# Patient Record
Sex: MALE | Race: AMERICAN INDIAN OR ALASKA NATIVE | Employment: FULL TIME | URBAN - METROPOLITAN AREA
[De-identification: names, ages, dates, MRNs, and addresses within clinical notes are randomized per-mention and may not be internally consistent; named-entity substitution may affect disease eponyms.]

---

## 2019-01-16 ENCOUNTER — TELEPHONE (OUTPATIENT)
Dept: SURGERY | Facility: CLINIC | Age: 39
End: 2019-01-16

## 2019-01-16 NOTE — TELEPHONE ENCOUNTER
Pt is an [address] [place] [address] employee, states he spoke to [address] and he mentioned he would be able to see him soon, states he discussed with PVK the reason he needs to see him, asking for appt sooner than march. Pls advise thank you.

## 2019-01-17 NOTE — TELEPHONE ENCOUNTER
LMTCB that I can offer a Consult appt for Fri. 2/1 at 2:40 pm. But e needs to c/b asap to confirm that he can accept that appt.

## 2019-01-25 NOTE — TELEPHONE ENCOUNTER
INNATCB. PT WILL NEED TO PAY THE CONSULT VISIT AMT OF $ 172.00 AT THE TIME OF THE APPT. BIRGIT, PLEASE PUT CALL THRU TO VERONA AT X [phone].

## 2019-01-25 NOTE — TELEPHONE ENCOUNTER
Spoke with pt and he confirmed that he can accept the appt and he stated that he does have INS as he is an [address] [place] [address] employee for over 2 yrs and he has requested new cards from the INS co. He gave an ID # of [id number], GRP # [id number].  I gave pt directions to th

## 2019-02-13 ENCOUNTER — TELEPHONE (OUTPATIENT)
Dept: SURGERY | Facility: CLINIC | Age: 39
End: 2019-02-13

## 2019-02-13 NOTE — TELEPHONE ENCOUNTER
Pt. states that he missed his Consult appt. and is requesting to be seen sooner then 1st avail., Pt. States that he works here at [address] [place] [address] and that [address] suggested for the pt. To see him. . An appt. Was offered to the pt. But pt. Prefers to be seen sooner.

## 2019-02-19 ENCOUNTER — OFFICE VISIT (OUTPATIENT)
Dept: INTERNAL MEDICINE CLINIC | Facility: CLINIC | Age: 39
End: 2019-02-19
Payer: COMMERCIAL

## 2019-02-19 VITALS
BODY MASS INDEX: 33.58 KG/M2 | SYSTOLIC BLOOD PRESSURE: 148 MMHG | DIASTOLIC BLOOD PRESSURE: 84 MMHG | HEIGHT: 67.75 IN | WEIGHT: 219 LBS | HEART RATE: 75 BPM

## 2019-02-19 DIAGNOSIS — N50.89 SCROTAL MASS: Primary | ICD-10-CM

## 2019-02-19 DIAGNOSIS — R03.0 ELEVATED BLOOD PRESSURE READING: ICD-10-CM

## 2019-02-19 PROCEDURE — 99212 OFFICE O/P EST SF 10 MIN: CPT | Performed by: INTERNAL MEDICINE

## 2019-02-19 PROCEDURE — 99202 OFFICE O/P NEW SF 15 MIN: CPT | Performed by: INTERNAL MEDICINE

## 2019-02-19 NOTE — TELEPHONE ENCOUNTER
Pt. States that he will accept. Appt. For 3/7/19 at 10:20am., but he is requesting for RN to call him at Work at [address]. I tried to reach RN, but not avail. ,

## 2019-02-19 NOTE — PROGRESS NOTES
Tammy Craig is a 34year old male. Patient presents with:  Hernia    HPI:   For approximately 2 years, he has had an enlarging \"bulge\" in his right groin and scrotum. He now notes occasional pain and discomfort associated with the area of swelling.   He is soft nontender without mass or hepatosplenomegaly  GENITAL: Large firm mass filling the entire right scrotum, with inability to examine adequately the right inguinal canal because of the size of the mass. ASSESSMENT AND PLAN:   1.  Scrotal mass  Not cl

## 2019-02-19 NOTE — TELEPHONE ENCOUNTER
Spoke to patient. Patient states he saw Dr. Chelita Fragoso today, Scrotal US ordered, is scheduled for tomorrow. Offered sooner consult for Friday 2/22 @ 0800. Patient agreed, consult appt changed.

## 2019-02-19 NOTE — PATIENT INSTRUCTIONS
Please schedule your ultrasound as soon as possible. Schedule an appointment for a physical when you can.

## 2019-02-19 NOTE — TELEPHONE ENCOUNTER
Pt scheduled with PVK on 4/16 for consult visit. Asking if there is anyway he can be seen sooner than next available appt.

## 2019-02-20 ENCOUNTER — TELEPHONE (OUTPATIENT)
Dept: INTERNAL MEDICINE CLINIC | Facility: CLINIC | Age: 39
End: 2019-02-20

## 2019-02-20 ENCOUNTER — HOSPITAL ENCOUNTER (OUTPATIENT)
Dept: ULTRASOUND IMAGING | Facility: HOSPITAL | Age: 39
Discharge: HOME | End: 2019-02-20
Attending: INTERNAL MEDICINE
Payer: COMMERCIAL

## 2019-02-20 DIAGNOSIS — N50.89 SCROTAL MASS: ICD-10-CM

## 2019-02-20 PROCEDURE — 76870 US EXAM SCROTUM: CPT | Performed by: INTERNAL MEDICINE

## 2019-02-20 PROCEDURE — 93975 VASCULAR STUDY: CPT | Performed by: INTERNAL MEDICINE

## 2019-02-20 NOTE — TELEPHONE ENCOUNTER
Pt called in to advise MTN that he had the U/S completed today and is requesting a call back with the results and next steps to be taken.   Please advise

## 2019-02-22 ENCOUNTER — OFFICE VISIT (OUTPATIENT)
Dept: SURGERY | Facility: CLINIC | Age: 39
End: 2019-02-22
Payer: COMMERCIAL

## 2019-02-22 VITALS
HEIGHT: 67 IN | HEART RATE: 74 BPM | WEIGHT: 219 LBS | SYSTOLIC BLOOD PRESSURE: 144 MMHG | BODY MASS INDEX: 34.37 KG/M2 | RESPIRATION RATE: 16 BRPM | TEMPERATURE: 98 F | DIASTOLIC BLOOD PRESSURE: 86 MMHG

## 2019-02-22 DIAGNOSIS — F17.200 SMOKING: ICD-10-CM

## 2019-02-22 DIAGNOSIS — N43.3 HYDROCELE, UNSPECIFIED HYDROCELE TYPE: Primary | ICD-10-CM

## 2019-02-22 DIAGNOSIS — R35.0 URINARY FREQUENCY: ICD-10-CM

## 2019-02-22 DIAGNOSIS — N50.82 SCROTAL PAIN: ICD-10-CM

## 2019-02-22 LAB
BILIRUB UR QL: NEGATIVE
CLARITY UR: CLEAR
COLOR UR: YELLOW
GLUCOSE UR-MCNC: NEGATIVE MG/DL
KETONES UR-MCNC: NEGATIVE MG/DL
NITRITE UR QL STRIP.AUTO: NEGATIVE
PH UR: 7 [PH] (ref 5–8)
PROT UR-MCNC: NEGATIVE MG/DL
RBC #/AREA URNS AUTO: 4 /HPF
SP GR UR STRIP: 1.02 (ref 1–1.03)
UROBILINOGEN UR STRIP-ACNC: <2
VIT C UR-MCNC: NEGATIVE MG/DL
WBC #/AREA URNS AUTO: 8 /HPF

## 2019-02-22 PROCEDURE — 99212 OFFICE O/P EST SF 10 MIN: CPT | Performed by: UROLOGY

## 2019-02-22 PROCEDURE — 99244 OFF/OP CNSLTJ NEW/EST MOD 40: CPT | Performed by: UROLOGY

## 2019-02-22 NOTE — PROGRESS NOTES
Manny Romberg is a 34year old male. HPI:   Patient presents with:  Testicular Swelling: right testicle swelling going on for 1 1/2 years, denies pain     History provided by patient. Patient works at [employer]     Scrotal Swelling  Right testicle s cancer. HISTORY:  No past medical history on file.    Past Surgical History:   Procedure Laterality Date   • OTHER 1987    Undescended left testis      Family History   Problem Relation Age of Onset   • Other (Lung Cancer) Father    • Breast Cancer time, place, person with normal affect  Exam appropriate for age; patellar reflexes normal  Head/Face: normocephalic  Eyes/Vision: no scleral icterus  Neck/Thyroid: neck is supple without adenopathy  Lymphatic: no abnormal cervical, supraclavicular or ingu enlarged fluid-filled scrotal sac/hydrocele measuring 16.6 x 9.5 x 2.3 cm. 2. Single testicle visualized without intrinsic lesion presumably the right testicle.          ASSESSMENT/PLAN:      (N43.3) Hydrocele, unspecified hydrocele type  (primary encounter decides to observe. He decides to provide a urine specimen for UA today.     (F17.200) Smoking  I explained to the patient that smoking increases risk for bladder cancer by 4x and recommend that patient take necessary precautions to allow for smoking cessat 2/22/2019  Cruzito Warner    By signing my name below, I, Cruzito Warner,  attest that this documentation has been prepared under the direction and in the presence of John Thomas MD.   Electronically Signed: Cruzito Warner, 2/22/2019,

## 2019-02-22 NOTE — PATIENT INSTRUCTIONS
Brittany Carson M.D.      1.  Urinalysis urine test--especially with ongoing smoking; look for any Microhematuria (microscopic blood in the urine)    2.   Please do everything possible to stop smoking since that increases the r

## 2019-02-24 ENCOUNTER — TELEPHONE (OUTPATIENT)
Dept: SURGERY | Facility: CLINIC | Age: 39
End: 2019-02-24

## 2019-02-24 DIAGNOSIS — F17.200 SMOKING: ICD-10-CM

## 2019-02-24 DIAGNOSIS — R31.29 MICROHEMATURIA: Primary | ICD-10-CM

## 2019-02-25 NOTE — TELEPHONE ENCOUNTER
I sent nurses the following assignment by means of \"result note\" ---  \"Nurses, please call patient; although staff is working on scheduling him for hydrocelectomy, UNEXPECTEDLY his urinalysis urine test came back abnormal showing 4 red blood cells which

## 2019-02-26 RX ORDER — PREDNISONE 50 MG/1
TABLET ORAL
Qty: 3 TABLET | Refills: 0 | Status: SHIPPED | OUTPATIENT
Start: 2019-02-26 | End: 2019-03-18

## 2019-02-26 NOTE — TELEPHONE ENCOUNTER
----- Message from Suzi Joya MD sent at 2/24/2019  7:31 PM CST -----  Nurses, please call patient; although staff is working on scheduling him for hydrocelectomy, UNEXPECTEDLY his urinalysis urine test came back abnormal showing 4 red blood cells w

## 2019-02-26 NOTE — TELEPHONE ENCOUNTER
Spoke to patient. Patient is aware of his urinalysis urine test and verbalized understanding. Patient states he has an allergy to shellfish; reaction was moderate with scratchy throat, hives.   Dr. Suzan Moran, do you want patient to have the Prep for CT

## 2019-02-27 ENCOUNTER — TELEPHONE (OUTPATIENT)
Dept: SURGERY | Facility: CLINIC | Age: 39
End: 2019-02-27

## 2019-02-27 NOTE — TELEPHONE ENCOUNTER
Please call patient back.   1.   CT urogram as to be performed at the [address] --please make clear allergy to shellfish, scratchy throat, hives  2    prednisone 50 mg   Tablet 13 hours before, 7 hours before, and 1 hour before procedure--I will try to e

## 2019-02-27 NOTE — TELEPHONE ENCOUNTER
Patient contacted. Relayed Dr. Antonella Luna' recommendations below to the patient. Patient verbalized understanding. Patient confirms his allergy to shellfish is scratchy throat and hives.   Patient is aware to scheduled his CT urogram at [facility], is

## 2019-02-27 NOTE — TELEPHONE ENCOUNTER
Pt called stating pt spoke to the rn yesterday to discuss the ct scan and pt's allergy. Advised would receive a call back.   Please call pt to advise

## 2019-02-27 NOTE — TELEPHONE ENCOUNTER
Patient states he would like to schedule hydrocelectomy/cysto with possible bladder biopsy. Please see 2/24 TE; results. Please see last office visit 2/22/19.

## 2019-03-08 ENCOUNTER — TELEPHONE (OUTPATIENT)
Dept: SURGERY | Facility: CLINIC | Age: 39
End: 2019-03-08

## 2019-03-08 ENCOUNTER — APPOINTMENT (OUTPATIENT)
Dept: LAB | Facility: HOSPITAL | Age: 39
End: 2019-03-08
Attending: UROLOGY
Payer: COMMERCIAL

## 2019-03-08 DIAGNOSIS — F17.200 SMOKING: ICD-10-CM

## 2019-03-08 DIAGNOSIS — R31.29 MICROHEMATURIA: ICD-10-CM

## 2019-03-08 PROCEDURE — 88108 CYTOPATH CONCENTRATE TECH: CPT

## 2019-03-08 PROCEDURE — 87086 URINE CULTURE/COLONY COUNT: CPT

## 2019-03-08 NOTE — TELEPHONE ENCOUNTER
Per Dr. Jacey Padilla request, patient has been scheduled, Tuesday, March 12 at , [facility] outpatient surgery center. Went over preop instructions with patient, verbalized understanding all questions answered.     Patient will be having CT urogram

## 2019-03-08 NOTE — IMAGING NOTE
Called pt via cellular phone to confirm pre-medication for contrast allergy. This is regarding his scheduled urogram on 3/11. No answer; left call back number.

## 2019-03-10 ENCOUNTER — HOSPITAL ENCOUNTER (OUTPATIENT)
Dept: CT IMAGING | Facility: HOSPITAL | Age: 39
Discharge: HOME | End: 2019-03-10
Attending: UROLOGY
Payer: COMMERCIAL

## 2019-03-10 ENCOUNTER — TELEPHONE (OUTPATIENT)
Dept: SURGERY | Facility: CLINIC | Age: 39
End: 2019-03-10

## 2019-03-10 DIAGNOSIS — F17.200 SMOKING: ICD-10-CM

## 2019-03-10 DIAGNOSIS — R31.29 MICROHEMATURIA: ICD-10-CM

## 2019-03-10 LAB — CREAT BLD-MCNC: 0.8 MG/DL (ref 0.5–1.5)

## 2019-03-10 PROCEDURE — 82565 ASSAY OF CREATININE: CPT

## 2019-03-10 PROCEDURE — 74178 CT ABD&PLV WO CNTR FLWD CNTR: CPT | Performed by: UROLOGY

## 2019-03-10 NOTE — TELEPHONE ENCOUNTER
Keisha Clark,  Please put urine cytology report and CT urogram report into preop packet for surgery on Tuesday, 3/12/19.   Many thanks, Dr. Dez Shell

## 2019-03-11 ENCOUNTER — TELEPHONE (OUTPATIENT)
Dept: SURGERY | Facility: CLINIC | Age: 39
End: 2019-03-11

## 2019-03-11 ENCOUNTER — HOSPITAL ENCOUNTER (OUTPATIENT)
Dept: CT IMAGING | Facility: HOSPITAL | Age: 39
Discharge: HOME | End: 2019-03-11
Attending: UROLOGY
Payer: COMMERCIAL

## 2019-03-11 NOTE — TELEPHONE ENCOUNTER
Printed and handed patient's to , CT urogram, urinalysis, urine culture, urine cytology is still in process. Once results are available, I will place results in patient's chart. Thanks Kim    Cytology results are done handed to dr. Latonia Dejesus.

## 2019-03-12 ENCOUNTER — LAB REQUISITION (OUTPATIENT)
Dept: LAB | Facility: HOSPITAL | Age: 39
End: 2019-03-12
Payer: COMMERCIAL

## 2019-03-12 ENCOUNTER — TELEPHONE (OUTPATIENT)
Dept: SURGERY | Facility: CLINIC | Age: 39
End: 2019-03-12

## 2019-03-12 DIAGNOSIS — Z01.89 ENCOUNTER FOR OTHER SPECIFIED SPECIAL EXAMINATIONS: ICD-10-CM

## 2019-03-12 PROCEDURE — 88112 CYTOPATH CELL ENHANCE TECH: CPT | Performed by: UROLOGY

## 2019-03-12 NOTE — TELEPHONE ENCOUNTER
Disability papers were fax'ed to Urology to be completed  Sent to [employer]  Dept and placed in bin [facility]

## 2019-03-12 NOTE — TELEPHONE ENCOUNTER
2  telephone discussions with patient on 3/11/19 =  3/10/19 patient had CT urogram on Sunday. I called patient at home Sunday evening; no answer but I leave voicemail.     3/11/19 Monday morning I called patient back; he answers; I explained that CT showed anesthesia at the surgery center. I reviewed with patient preoperative preparations. He had more questions but patient's in the office were waiting for me so I pronounced patient would come back.   I did call patient back this evening and he had more ques

## 2019-03-13 NOTE — TELEPHONE ENCOUNTER
Nurses,  Please check with this patient by phone on a daily basis concerning drainage through his suction drain. Let me know when it is 30 cc or less per 24 hours. Patient needs to measure and total it up every 24 hours.   Tentatively would like his drain

## 2019-03-13 NOTE — TELEPHONE ENCOUNTER
Dr. Es Salgado ,    3/12/19 complex exploration right scrotum, excision of giant right hydrocele (greater than 1 L fluid; 20 cm giant hydrocele),  Insertion of drain, cystoscopy (for workup of microhematuria--there were no bladder tumors). Performed at [address] [place] [address]; went home afterwards. Patient will follow up with us in the office.       Many thanks,        Lizzie Stevens M.D.

## 2019-03-13 NOTE — TELEPHONE ENCOUNTER
Patient contacted. Relayed Dr. Adilson Corley' orders below to the patient. Patient verbalized understanding. Patient states last night he emptied the suction drain twice last night for about a total of 40 cc.   Patient states he did not record this or the kenyon

## 2019-03-13 NOTE — TELEPHONE ENCOUNTER
[employer] Disability form for Dr. Abdelrahman Wilkinson received in Forms dept. Logged for processing. [employer] packet emailed to [email or web address]  NK

## 2019-03-14 NOTE — TELEPHONE ENCOUNTER
Patient called to report he emptied his suction train and for the past 24 hours he recorded 50 cc of drainage. Patient describes drainage as sanguineous. Informed patient that when drainage is 30 cc or less in 24 hours, drain can be removed by [facility].  (s

## 2019-03-15 NOTE — TELEPHONE ENCOUNTER
Patient called to report that he measured a total of 20 cc of drainage in the last 24 hours from his suction drain.       Per Dr. Olayinka Moore' orders below; when drainage through his suction drain is 30 cc or less per 24 hours, drain can be removed here in of

## 2019-03-15 NOTE — TELEPHONE ENCOUNTER
Called patient and offer f/u with Radha MILLER on Monday 3/18 @ 9:45 am. Patient agreed. I asked the PPD template via a email to open the time so that I can add patient.

## 2019-03-18 ENCOUNTER — OFFICE VISIT (OUTPATIENT)
Dept: SURGERY | Facility: CLINIC | Age: 39
End: 2019-03-18
Payer: COMMERCIAL

## 2019-03-18 ENCOUNTER — TELEPHONE (OUTPATIENT)
Dept: SURGERY | Facility: CLINIC | Age: 39
End: 2019-03-18

## 2019-03-18 VITALS
WEIGHT: 219 LBS | HEART RATE: 70 BPM | SYSTOLIC BLOOD PRESSURE: 133 MMHG | TEMPERATURE: 98 F | BODY MASS INDEX: 34 KG/M2 | DIASTOLIC BLOOD PRESSURE: 75 MMHG

## 2019-03-18 DIAGNOSIS — Z98.890 HISTORY OF HYDROCELECTOMY: ICD-10-CM

## 2019-03-18 DIAGNOSIS — Z48.03 ENCOUNTER FOR CHANGE OR REMOVAL OF DRAINS: Primary | ICD-10-CM

## 2019-03-18 PROCEDURE — 99212 OFFICE O/P EST SF 10 MIN: CPT | Performed by: NURSE PRACTITIONER

## 2019-03-18 PROCEDURE — 99024 POSTOP FOLLOW-UP VISIT: CPT | Performed by: NURSE PRACTITIONER

## 2019-03-18 NOTE — TELEPHONE ENCOUNTER
[employer] Disability forms for Dr. Suzan Moran received in Forms dept+ FCR+ Signed release, paid $25 w/ . Logged for processing.  NK

## 2019-03-18 NOTE — PROGRESS NOTES
HPI:    Patient ID: Manny Romberg is a 34year old male. HPI     Patient is a 34year old male who presents to the clinic today for follow up and drain removal.  He is s/p right hydrocelectomy and cystoscopy on 3/12/19. He is doing well post operatively. Large amount of scrotal swelling  Scrotum incision healing with no drainage or erythema. KATHY drain intact to bulb suction with serosanguinous drainage   Musculoskeletal: Normal range of motion.    Neurological: He is alert and oriented to person, place, a

## 2019-03-18 NOTE — TELEPHONE ENCOUNTER
Pt dropped off all  Disability  Papers to be completed and signed. [employer] signed  $25 Pd. Sent to  [employer] .  Placed in bin @ [address] [place] [address]

## 2019-03-21 NOTE — TELEPHONE ENCOUNTER
Dr. Moris Mcmullen,  2 forms: FMLA & Disab    Please sign off on form:  -Highlight the patient and hit \"Chart\" button. -In Chart Review, w/in the Encounter tab - click 1 time on the Telephone call encounter for 3/12/19. Scroll down the telephone encounter.

## 2019-04-04 ENCOUNTER — OFFICE VISIT (OUTPATIENT)
Dept: SURGERY | Facility: CLINIC | Age: 39
End: 2019-04-04
Payer: COMMERCIAL

## 2019-04-04 VITALS
TEMPERATURE: 98 F | SYSTOLIC BLOOD PRESSURE: 121 MMHG | HEIGHT: 67 IN | WEIGHT: 219 LBS | DIASTOLIC BLOOD PRESSURE: 78 MMHG | HEART RATE: 69 BPM | BODY MASS INDEX: 34.37 KG/M2

## 2019-04-04 DIAGNOSIS — Z98.890 S/P REPAIR OF HYDROCELE: Primary | ICD-10-CM

## 2019-04-04 DIAGNOSIS — F17.200 SMOKING: ICD-10-CM

## 2019-04-04 DIAGNOSIS — Z87.438 S/P REPAIR OF HYDROCELE: Primary | ICD-10-CM

## 2019-04-04 DIAGNOSIS — R35.0 URINARY FREQUENCY: ICD-10-CM

## 2019-04-04 NOTE — PATIENT INSTRUCTIONS
Nehemiah Hernandez M.D.      1.  You may go back to work without restriction on Monday 4/8/19    2. Please make appointment to see us in 6 months.     3.  Please call my office nurses immediately if you ever see visible blood in

## 2019-04-04 NOTE — PROGRESS NOTES
HPI:    Patient ID: Pinky Jacome is a 34year old male. HPI  Hydrocele  S/p 3/12/19 successful right hydrocelectomy. 3/8/2019 Urine cytology = negative for high-grade urothelial carcinoma.  3/10/2019 CT Urogram (W+WO) = Large right hydrocele containing righ right testicle within; displacement of scrotal structures so extreme that I cannot tell if the contralateral left testicle is atrophic or still in an undescended location; patient has a history of orchiopexy; 20 cm hydrocele dissecting its way into right i Psychiatric/Behavioral: The patient is not nervous/anxious. No current outpatient medications on file. Allergies:  Radiology Contrast *    HIVES, SWELLING  Shellfish-Derived P*    HIVES, SWELLING    HISTORY:  No past medical history on file. Large right hydrocele containing right testicle; probable undescended atrophic testicle in the left inguinal canal; no evidence of inguinal hernia.     2/20/2019 US SCROTUM W/DOPPLER = No testicle visualized within the left inguinal canal; Markedly enlarged understands all of this and decides to proceed with the following:       Treatment Plan & Patient Instructions    1. You may go back to work without restriction on Monday 4/8/19    2. Please make appointment to see us in 6 months.     3.  Please call my

## 2019-09-24 ENCOUNTER — TELEPHONE (OUTPATIENT)
Dept: SURGERY | Facility: CLINIC | Age: 39
End: 2019-09-24

## 2019-09-24 NOTE — TELEPHONE ENCOUNTER
Pt requesting to speak with Rn. Pt had to cancel and rs his appt due to Dudley Notice not being in the office for 10/04. SERJIOK next available appt is 12/23, pt scheduled appt but wants to know if SERJIOK says it is okay to have his f/u appt so far away or should he be seen sooner.

## 2020-02-12 ENCOUNTER — TELEPHONE (OUTPATIENT)
Dept: SURGERY | Facility: CLINIC | Age: 40
End: 2020-02-12

## 2020-02-12 NOTE — TELEPHONE ENCOUNTER
Per pt is due for surgery f/u, states his appt was cancelled back in October due to doctor being out of office, pt would like appointment sooner than next available in June. Please advise thank you.

## 2020-02-13 NOTE — TELEPHONE ENCOUNTER
Spoke with patient, assisted in scheduling follow up with . PT confirmed and verbalized understanding.      Future Appointments   Date Time Provider Serg French   2/17/2020 10:40 AM Nida Hassan MD [facility]

## 2020-02-17 ENCOUNTER — OFFICE VISIT (OUTPATIENT)
Dept: SURGERY | Facility: CLINIC | Age: 40
End: 2020-02-17
Payer: COMMERCIAL

## 2020-02-17 VITALS
HEIGHT: 68 IN | WEIGHT: 215 LBS | BODY MASS INDEX: 32.58 KG/M2 | SYSTOLIC BLOOD PRESSURE: 113 MMHG | RESPIRATION RATE: 16 BRPM | HEART RATE: 78 BPM | DIASTOLIC BLOOD PRESSURE: 74 MMHG

## 2020-02-17 DIAGNOSIS — F17.200 SMOKING: ICD-10-CM

## 2020-02-17 DIAGNOSIS — R35.0 URINARY FREQUENCY: ICD-10-CM

## 2020-02-17 DIAGNOSIS — N43.3 HYDROCELE, UNSPECIFIED HYDROCELE TYPE: Primary | ICD-10-CM

## 2020-02-17 PROCEDURE — 99213 OFFICE O/P EST LOW 20 MIN: CPT | Performed by: UROLOGY

## 2020-02-17 NOTE — PATIENT INSTRUCTIONS
Marcella Thurman M.D.      1.  Please explore with Dr. Lavon Krishnamurthy treatment options to stop smoking including medications versus acupuncture versus yoga or other options.   Please be aware that ongoing smoking increases risk of bladd

## 2020-02-17 NOTE — PROGRESS NOTES
HPI:    Patient ID: Maddy German is a 37year old male. HPI     Hydrocele  On 3/8/2019 Urine cytology = negative for high-grade urothelial carcinoma.  On 3/10/2019 CT Urogram = Large right hydrocele containing right testicle; probable undescended atrophic t testicular mass, recommend ultrasound testes as soon as possible, pending result may need to see Urology sooner than March.   2/22/2019 consult Dr. Ian Avalos on exam Right hydrocele so large it is impossible to feel right testicle within; displacement of scrotal Negative for chest tightness and shortness of breath. Cardiovascular: Negative for chest pain. Gastrointestinal: Negative for abdominal pain and constipation. Genitourinary: Negative for dysuria, flank pain and hematuria.    Skin: Negative for color you got up in the morning?: Not at all  Total Symptom Score: 3  If you were to spend the rest of your life with your urinary condition just the way it is now, how would you feel about that?: Pleased         PHYSICAL EXAM:    Physical Exam   Constitutional: diagnosis)  Urinary frequency  Smoking     (N43.3) Hydrocele, unspecified hydrocele type   S/p 3/12/19 successful right hydrocelectomy.  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 Please explore with Dr. Radha Cornejo treatment options to stop smoking including medications versus acupuncture versus yoga or other options.   Please be aware that ongoing smoking increases risk of bladder cancer by 4 times and also increases

## 2021-10-18 ENCOUNTER — HOSPITAL ENCOUNTER (OUTPATIENT)
Age: 41
Discharge: HOME | End: 2021-10-18
Payer: COMMERCIAL

## 2021-10-18 PROCEDURE — 0031A HC JANSSEN COVID-19 VACCINE ADMIN 1ST DOSE: CPT

## 2022-11-30 ENCOUNTER — IMMUNIZATION (OUTPATIENT)
Dept: LAB | Facility: HOSPITAL | Age: 42
End: 2022-11-30
Attending: PREVENTIVE MEDICINE
Payer: COMMERCIAL

## 2022-11-30 DIAGNOSIS — Z23 NEED FOR VACCINATION: Primary | ICD-10-CM

## 2022-11-30 PROCEDURE — 90471 IMMUNIZATION ADMIN: CPT

## 2022-12-29 ENCOUNTER — HOSPITAL ENCOUNTER (OUTPATIENT)
Age: 42
Discharge: HOME | End: 2022-12-29
Attending: EMERGENCY MEDICINE
Payer: COMMERCIAL

## 2022-12-29 ENCOUNTER — TELEPHONE (OUTPATIENT)
Dept: INTERNAL MEDICINE CLINIC | Facility: CLINIC | Age: 42
End: 2022-12-29

## 2022-12-29 ENCOUNTER — HOSPITAL ENCOUNTER (OUTPATIENT)
Dept: ULTRASOUND IMAGING | Age: 42
Discharge: HOME | End: 2022-12-29
Attending: EMERGENCY MEDICINE
Payer: COMMERCIAL

## 2022-12-29 VITALS
HEART RATE: 97 BPM | RESPIRATION RATE: 19 BRPM | OXYGEN SATURATION: 98 % | TEMPERATURE: 100 F | DIASTOLIC BLOOD PRESSURE: 90 MMHG | SYSTOLIC BLOOD PRESSURE: 145 MMHG

## 2022-12-29 DIAGNOSIS — M79.89 RIGHT LEG SWELLING: Primary | ICD-10-CM

## 2022-12-29 PROCEDURE — 99203 OFFICE O/P NEW LOW 30 MIN: CPT

## 2022-12-29 PROCEDURE — 93971 EXTREMITY STUDY: CPT | Performed by: EMERGENCY MEDICINE

## 2022-12-29 PROCEDURE — 99204 OFFICE O/P NEW MOD 45 MIN: CPT

## 2022-12-29 NOTE — TELEPHONE ENCOUNTER
Per chart review, patient has not had IM office visit for 3+ years    New patient courtesy assessment:  Reason for Call/Symptoms: Right foot and right calf swelling. Patient reports his right shoe feels very tight.   Onset: noticed some swelling for some time (about 1 year), but reports today he noticed significantly more swelling in right calf  Courtesy Assessment: possible DVT  Level of care recommendation: ED/ICC  Advice given to patient: go to immediate care or ER for evaluation

## 2022-12-29 NOTE — ED INITIAL ASSESSMENT (HPI)
Patient with edema to Right foot and calf for over 3 months. Patient states he didn't really notice the swelling until he put on some tighter new shoes today. Denies new medication use  No SOB/. Chest pain/ fatigue   No injury  On feet a lot at work, 12 hour shifts.

## 2023-01-13 ENCOUNTER — TELEPHONE (OUTPATIENT)
Dept: INTERNAL MEDICINE CLINIC | Facility: HOSPITAL | Age: 43
End: 2023-01-13

## 2023-01-13 DIAGNOSIS — Z20.822 SUSPECTED 2019 NOVEL CORONAVIRUS INFECTION: Primary | ICD-10-CM

## 2023-01-14 ENCOUNTER — LAB ENCOUNTER (OUTPATIENT)
Dept: LAB | Age: 43
End: 2023-01-14
Attending: PREVENTIVE MEDICINE
Payer: COMMERCIAL

## 2023-01-14 DIAGNOSIS — Z20.822 SUSPECTED 2019 NOVEL CORONAVIRUS INFECTION: ICD-10-CM

## 2023-01-14 LAB — SARS-COV-2 RNA RESP QL NAA+PROBE: NOT DETECTED

## 2023-01-14 NOTE — TELEPHONE ENCOUNTER
Results and RTW guidelines:    COVID RESULT:    [x] Viewed by employee in [address] RTW plan and instructions as indicated on triage call. Manager notified. Estimated RTW date:   [] Discussed with employee   [x] Unable to reach by phone. Sent via [employer] message      Test type:    [x] Rapid         [] Alinity         [] Outside test:       [x] NEGATIVE     Ordered Alinity retest?  []Yes   [x] No (skip to RTW)   Ordered Rapid retest?   []Yes   [x] No (skip to RTW)          Notes:     RTW PLAN:    []  If COVID positive results, off work minimum of 5 days from positive test or onset of symptoms (day 0)        On day 5, if asymptomatic or mildly symptomatic (with improving symptoms) may return to work day 6          On day 5, if symptomatic, call Employee Health for RTW screening        []  COVID positive result - call Employee Health on day 5 after symptom onset. The employee needs to be cleared by Employee Health to RTW. [x] RTW immediately, continue to monitor for sx  [] RTW when sx improve; must be fever free for 24 hours w/o medications, Diarrhea/Vomiting free for 24 hours w/o medications  [] Alinity ordered; continue to monitor sx and call for new/worsening sx.   Discuss RTW guidelines with manager  [] May continue to work  [] Follow up with PCP  [] Home until further instruction from hotline with Alinity results  INSTRUCTIONS PROVIDED:  [x]  Plan as noted above  []  Length of time to obtain results   []  Quarantine instructions  []  Masking protocol   []  S/S of worsening infection/condition and importance of prompt medical re-evaluation including when to seek emergency care  [] If symptoms develop, stay home and call hotline for rapid test order    Estimated RTW date:      [] The employee voiced understanding of above plan/instructions  [x] Manager Notified        \

## 2024-02-13 ENCOUNTER — LAB ENCOUNTER (OUTPATIENT)
Dept: LAB | Age: 44
End: 2024-02-13
Attending: PREVENTIVE MEDICINE
Payer: COMMERCIAL

## 2024-02-13 ENCOUNTER — TELEPHONE (OUTPATIENT)
Dept: INTERNAL MEDICINE CLINIC | Facility: HOSPITAL | Age: 44
End: 2024-02-13

## 2024-02-13 DIAGNOSIS — Z20.822 SUSPECTED COVID-19 VIRUS INFECTION: ICD-10-CM

## 2024-02-13 DIAGNOSIS — Z20.822 SUSPECTED COVID-19 VIRUS INFECTION: Primary | ICD-10-CM

## 2024-02-13 LAB — SARS-COV-2 RNA RESP QL NAA+PROBE: NOT DETECTED

## 2024-02-13 NOTE — TELEPHONE ENCOUNTER
[] EH  []JOHANA   [x] [facility]  Manager : [place]    [] Direct Patient Care  [x]Indirect Patient Contact   [] Non-Clinical/No Patient Contact    For Direct Patient Care ONLY: Have you been fitted with an N95 mask? [] Yes  [x]No      HAVE YOU RECEIVED THE COVID-19 Vaccine? Yes [x]    No []          If yes, date(s) received:    10/18/201        Which vaccine:  Pfizer []     Moderna []    J&J [x]      SYMPTOMS (reported via dashboard):  [] asymptomatic  [x] symptomatic  [] GI symptoms only    Symptom onset date: 02/11/2024  Fever   > 100F             Yes []      Cough                          Yes [x]      Shortness of breath  Yes []      Congestion                 Yes [x]      Runny nose                Yes [x]        Loss of Smell              Yes []        Loss of Taste             Yes []       Sore throat                 Yes []       Fatigue                        Yes [x]       Body Aches                Yes []        Chills                           Yes [x]        Headache                   Yes []             GI symptoms             Yes [x]     No []                     Nausea   []          Vomiting            []                                    Diarrhea  [x]          Upset stomach []      Employee reported COVID Exposure?  Yes []     No [x]    Date of exposure:   []  Coworker                       [] patient                        [] Family/friend    PPE:   [] N95 Mask/PAPR  [] Standard Mask  [] Eyewear  [] None    Within 6 feet for >15 minutes? [] Yes []  No    Is this a true exposure? []  Yes []  No    When was the last shift you worked?: 02/12/2024    Employee has a history of Covid?  Yes []     No [x]   If Yes, when:    PLAN:     COVID-19 testing ordered:   [x] Rapid      [] Alinity              Date test is to be taken:   02/13/2024    []  No testing required at this time  []  Outside testing                           Notes:    INSTRUCTIONS PROVIDED:    [x]  Employee was instructed to call Central scheduling  at [phone] or use [employer] to make an appointment for their testing   [x]  May return to work if employee views negative result in [employer] and remains fever, vomiting, and diarrhea free  []  May continue to work if remains asymptomatic and views negative result in MyChart  []  Follow up for condition update when resulting  []  If symptoms develop, stay home and call hotline for rapid test order  []  If COVID positive results, off work minimum of 5 days from positive test or onset of symptoms (day 0)     [x]  Plan noted above  [x]  Length of time to obtain results  []  Quarantine instructions  [x]  S/S of worsening infection/condition and importance of prompt medical re-evaluation including when to seek emergency care.   [x] The employee voiced understanding

## 2024-02-13 NOTE — TELEPHONE ENCOUNTER
Results and RTW guidelines:    COVID RESULT:    [x] Viewed by employee in [employer].  RTW plan and instructions as indicated on triage call.  Manager notified.  Estimated RTW date: 02/13/2024  [] Discussed with employee   [] Unable to reach by phone.  Sent via [employer] message      Test type:    [x] Rapid         [] Alinity         [] Outside test:       [x] NEGATIVE     Ordered Alinity retest?  []Yes   [x] No (skip to RTW)   Ordered Rapid retest?   []Yes   [x] No (skip to RTW)           Dated to be taken:      If Yes, PLACE ORDER NOW and instruct the following:  -Originally Symptomatic or Now Symptoms:   -RTW when sx improve- fever free for 24 hours w/o medications, Diarrhea/Vomiting for 24 hours w/o medications    -Originally  Asymptomatic  -Asymptomatic AND Vaccinated or Unvaccinated or Prior infection in past 90 days:   -May work and continue to monitor symptoms for the next 10 days.                                         -Alinity test day 2, Alinity test day 5 (day 0 - exposure)

## 2024-03-02 ENCOUNTER — TELEPHONE (OUTPATIENT)
Dept: PULMONOLOGY | Facility: CLINIC | Age: 44
End: 2024-03-02

## 2024-03-02 RX ORDER — AZITHROMYCIN 250 MG/1
TABLET, FILM COATED ORAL
Qty: 6 TABLET | Refills: 0 | Status: SHIPPED | OUTPATIENT
Start: 2024-03-02

## 2025-02-12 ENCOUNTER — TELEPHONE (OUTPATIENT)
Dept: INTERNAL MEDICINE CLINIC | Facility: HOSPITAL | Age: 45
End: 2025-02-12

## 2025-02-12 ENCOUNTER — LAB ENCOUNTER (OUTPATIENT)
Dept: LAB | Age: 45
End: 2025-02-12
Attending: PREVENTIVE MEDICINE
Payer: COMMERCIAL

## 2025-02-12 DIAGNOSIS — Z20.822 SUSPECTED COVID-19 VIRUS INFECTION: ICD-10-CM

## 2025-02-12 DIAGNOSIS — Z20.822 SUSPECTED COVID-19 VIRUS INFECTION: Primary | ICD-10-CM

## 2025-02-12 LAB — SARS-COV-2 RNA RESP QL NAA+PROBE: NOT DETECTED

## 2025-03-04 ENCOUNTER — NURSE ONLY (OUTPATIENT)
Dept: INTERNAL MEDICINE CLINIC | Facility: HOSPITAL | Age: 45
End: 2025-03-04
Attending: PREVENTIVE MEDICINE

## 2025-03-04 DIAGNOSIS — Z00.00 WELLNESS EXAMINATION: Primary | ICD-10-CM

## 2025-03-04 PROCEDURE — 86480 TB TEST CELL IMMUN MEASURE: CPT

## 2025-03-06 LAB
M TB IFN-G CD4+ T-CELLS BLD-ACNC: 0.01 IU/ML
M TB TUBERC IFN-G BLD QL: NEGATIVE
M TB TUBERC IGNF/MITOGEN IGNF CONTROL: >10 IU/ML
QFT TB1 AG MINUS NIL: 0.03 IU/ML
QFT TB2 AG MINUS NIL: 0.05 IU/ML

## 2025-04-10 ENCOUNTER — NURSE ONLY (OUTPATIENT)
Dept: INTERNAL MEDICINE CLINIC | Facility: HOSPITAL | Age: 45
End: 2025-04-10
Attending: PREVENTIVE MEDICINE

## 2025-04-10 DIAGNOSIS — Z00.00 WELLNESS EXAMINATION: Primary | ICD-10-CM

## 2025-04-10 PROCEDURE — 86480 TB TEST CELL IMMUN MEASURE: CPT

## 2025-04-12 LAB
M TB IFN-G CD4+ T-CELLS BLD-ACNC: 0.04 IU/ML
M TB TUBERC IFN-G BLD QL: NEGATIVE
M TB TUBERC IGNF/MITOGEN IGNF CONTROL: >10 IU/ML
QFT TB1 AG MINUS NIL: 0.04 IU/ML
QFT TB2 AG MINUS NIL: 0.05 IU/ML

## (undated) NOTE — LETTER
No referring provider defined for this encounter. 02/22/19        Patient: Grupo Howe   YOB: 1980   Date of Visit: 2/22/2019       Dear  Dr. Sharyle Pian, MD,      Thank you for referring Grupo Howe to my practice.   Please find my as that there's a very unlikely possibility that the right   hydrocelectomy   could make him infertile or lead to low testosterone levels. Patient understands all of this and decides to undergo same day right scrotal hydrocelectomy with inguinal dissection.  I 6.  If at all possible, please shave your right scrotum and right groin when you wake up on the morning of the procedure      Ace is a very pleasant patient and I  thank you for sending the patient to see me.   I will do my best to keep you informed of  all

## (undated) NOTE — LETTER
4/4/2019          To Whom It May Concern: Panchito Turner is currently under my medical care and may return to work Monday 4/7/19, without restrictions. If you require additional information please contact our office.         Sincerely,    Sherry Miller,

## (undated) NOTE — LETTER
4/4/2019          To Whom It May Concern: Melvin Blank is currently under my medical care and may return to work Monday 4/8/19, without restrictions. If you require additional information please contact our office.         Sincerely,    Carlton Alamo